# Patient Record
Sex: FEMALE | Race: WHITE | Employment: FULL TIME | URBAN - METROPOLITAN AREA
[De-identification: names, ages, dates, MRNs, and addresses within clinical notes are randomized per-mention and may not be internally consistent; named-entity substitution may affect disease eponyms.]

---

## 2017-08-25 ENCOUNTER — APPOINTMENT (OUTPATIENT)
Dept: GENERAL RADIOLOGY | Age: 47
End: 2017-08-25
Attending: FAMILY MEDICINE
Payer: SELF-PAY

## 2017-08-25 ENCOUNTER — HOSPITAL ENCOUNTER (EMERGENCY)
Age: 47
Discharge: HOME | End: 2017-08-25
Attending: FAMILY MEDICINE
Payer: SELF-PAY

## 2017-08-25 VITALS
DIASTOLIC BLOOD PRESSURE: 72 MMHG | SYSTOLIC BLOOD PRESSURE: 124 MMHG | RESPIRATION RATE: 16 BRPM | TEMPERATURE: 98.8 F | HEART RATE: 75 BPM | HEIGHT: 64 IN | BODY MASS INDEX: 25.95 KG/M2 | WEIGHT: 152 LBS | OXYGEN SATURATION: 98 %

## 2017-08-25 DIAGNOSIS — S82.831A CLOSED FRACTURE OF DISTAL END OF RIGHT FIBULA, UNSPECIFIED FRACTURE MORPHOLOGY, INITIAL ENCOUNTER: Primary | ICD-10-CM

## 2017-08-25 PROCEDURE — 75810000053 HC SPLINT APPLICATION

## 2017-08-25 PROCEDURE — 99283 EMERGENCY DEPT VISIT LOW MDM: CPT

## 2017-08-25 PROCEDURE — 73610 X-RAY EXAM OF ANKLE: CPT

## 2017-08-25 RX ORDER — IBUPROFEN 600 MG/1
600 TABLET ORAL
Qty: 20 TAB | Refills: 0 | Status: SHIPPED | OUTPATIENT
Start: 2017-08-25

## 2017-08-25 RX ORDER — OXYCODONE AND ACETAMINOPHEN 5; 325 MG/1; MG/1
1 TABLET ORAL
Qty: 10 TAB | Refills: 0 | Status: SHIPPED | OUTPATIENT
Start: 2017-08-25

## 2017-08-25 NOTE — ED PROVIDER NOTES
[address]  EMERGENCY DEPARTMENT HISTORY AND PHYSICAL EXAM       Date: 2017   Patient Name: Maura Yoon   YOB: 1970  Medical Record Number: [id number]    History of Presenting Illness     Chief Complaint   Patient presents with    Ankle Injury        History Provided By:  patient    Additional History: 8:46 AM  Maura Yoon is a 52 y.o. female presenting to the ED C/O right ankle pain onset last night s/p slipping on steps and falling. Associated sxs include right ankle swelling. PSHx includes tubal ligation and appendectomy. Pt denies head injury, and any other symptoms or complaints at this time. Primary Care Provider: None   Specialist:    Past History     Past Medical History:   History reviewed. No pertinent past medical history. Past Surgical History:   Past Surgical History:   Procedure Laterality Date    HX APPENDECTOMY      HX  SECTION      HX TUBAL LIGATION          Family History:   History reviewed. No pertinent family history. Social History:   Social History   Substance Use Topics    Smoking status: Current Every Day Smoker     Packs/day: 0.50    Smokeless tobacco: None    Alcohol use Yes      Comment: socially        Allergies:   No Known Allergies     Review of Systems   Review of Systems   Constitutional: Negative for chills and fever. Musculoskeletal: Positive for arthralgias (right ankle) and joint swelling (right ankle). All other systems reviewed and are negative. Physical Exam  Vitals:    17 0844   BP: 124/72   Pulse: 75   Resp: 16   Temp: 98.8 °F (37.1 °C)   SpO2: 98%   Weight: 68.9 kg (152 lb)   Height: 5' 4\" (1.626 m)       Physical Exam   Constitutional: She is oriented to person, place, and time. She appears well-developed and well-nourished. No distress. HENT:   Head: Normocephalic and atraumatic. Eyes: Conjunctivae and EOM are normal. Pupils are equal, round, and reactive to light.    Neck: Normal range of motion. Neck supple. Musculoskeletal: She exhibits edema and tenderness. She exhibits no deformity. Right knee: Normal.        Right ankle: She exhibits decreased range of motion and swelling. She exhibits no ecchymosis, no deformity, no laceration and normal pulse. Tenderness. Lateral malleolus tenderness found. No medial malleolus tenderness found. Achilles tendon normal.        Right foot: Normal.   Pedal pulses normal   Neurological: She is alert and oriented to person, place, and time. Skin: Skin is warm and dry. Psychiatric: She has a normal mood and affect. Her behavior is normal.   Nursing note and vitals reviewed. Diagnostic Study Results     Labs -    No results found for this or any previous visit (from the past 12 hour(s)). Radiologic Studies -  The following have been ordered and reviewed:  XR ANKLE RT MIN 3 V    (Results Pending)     9:10 AM  RADIOLOGY FINDINGS  Right ankle X-ray shows distal fibula fracture. Pending review by Radiologist  Recorded by Sherry Negrete ED Scribe, as dictated by Nereyda Ruelas PA-C    Medical Decision Making   I am the first provider for this patient. I reviewed the vital signs, available nursing notes, past medical history, past surgical history, family history and social history. Vital Signs-Reviewed the patient's vital signs. Patient Vitals for the past 12 hrs:   Temp Pulse Resp BP SpO2   08/25/17 0844 98.8 °F (37.1 °C) 75 16 124/72 98 %       Pulse Oximetry Analysis - Normal 98% on RA. Old Medical Records: Nursing notes. Procedures:   Procedures    Procedure Note - Splint Assessement:  Performed by: Nereyda Ruelas PA-C  Splint to right ankle assessed. Neurovascularly intact. The procedure took 1-15 minutes, and pt tolerated well. Written by Sherry Negrete ED Scribe, as dictated by MARGIE Liang     ED Course:  8:26 AM  Initial assessment performed.  The patients presenting problems have been discussed, and they are in agreement with the care plan formulated and outlined with them. I have encouraged them to ask questions as they arise throughout their visit. Medications Given in the ED:  Medications - No data to display    Discharge Note:  9:14 AM  Pt has been reexamined. Patient has no new complaints, changes, or physical findings. Care plan outlined and precautions discussed. Results were reviewed with the patient. All medications were reviewed with the patient; will d/c home with Percocet and Motrin. All of pt's questions and concerns were addressed. Patient was instructed and agrees to follow up with Orthopedics, as well as to return to the ED upon further deterioration. Patient is ready to go home. Diagnosis   Clinical Impression:   1. Closed fracture of distal end of right fibula, unspecified fracture morphology, initial encounter         Follow-up Information     Follow up With Details Comments Contact Info    Fransisca Mcleod MD Schedule an appointment as soon as possible for a visit in 2 days For orthopedic follow up. [address]      THE [facility] EMERGENCY DEPT Go to As needed, If symptoms worsen [address] [facility]  [phone]          Current Discharge Medication List      START taking these medications    Details   oxyCODONE-acetaminophen (PERCOCET) 5-325 mg per tablet Take 1 Tab by mouth every four (4) hours as needed for Pain. Max Daily Amount: 6 Tabs. Qty: 10 Tab, Refills: 0      ibuprofen (MOTRIN) 600 mg tablet Take 1 Tab by mouth every six (6) hours as needed for Pain. Qty: 20 Tab, Refills: 0             _______________________________   Attestations: This note is prepared by Santa Winslow, acting as a Scribe for Cory Shelley PA-C on 8:37 AM on 8/25/2017 .     Cory Shelley PA-C: The scribe's documentation has been prepared under my direction and personally reviewed by me in its entirety.   _______________________________

## 2017-08-25 NOTE — DISCHARGE INSTRUCTIONS
Broken Ankle: Care Instructions  Your Care Instructions    An ankle may break (fracture) during sports, a fall, or other accidents. Fractures can range from a small, hairline crack, to a bone or bones broken into two or more pieces. Your treatment depends on how bad the break is. Your doctor may have put your ankle in a splint or cast to allow it to heal or to keep it stable until you see another doctor. It may take weeks or months for your ankle to heal. You can help your ankle heal with some care at home. You heal best when you take good care of yourself. Eat a variety of healthy foods, and don't smoke. You may have had a sedative to help you relax. You may be unsteady after having sedation. It can take a few hours for the medicine's effects to wear off. Common side effects of sedation include nausea, vomiting, and feeling sleepy or tired. The doctor has checked you carefully, but problems can develop later. If you notice any problems or new symptoms, get medical treatment right away. Follow-up care is a key part of your treatment and safety. Be sure to make and go to all appointments, and call your doctor if you are having problems. It's also a good idea to know your test results and keep a list of the medicines you take. How can you care for yourself at home? · If the doctor gave you a sedative:  ¨ For 24 hours, don't do anything that requires attention to detail. It takes time for the medicine's effects to completely wear off. ¨ For your safety, do not drive or operate any machinery that could be dangerous. Wait until the medicine wears off and you can think clearly and react easily. · Put ice or a cold pack on your ankle for 10 to 20 minutes at a time. Try to do this every 1 to 2 hours for the next 3 days (when you are awake). Put a thin cloth between the ice and your cast or splint. Keep your cast or splint dry. · Follow the cast care instructions your doctor gives you.  If you have a splint, do not take it off unless your doctor tells you to. · Be safe with medicines. Take pain medicines exactly as directed. ¨ If the doctor gave you a prescription medicine for pain, take it as prescribed. ¨ If you are not taking a prescription pain medicine, ask your doctor if you can take an over-the-counter medicine. · Prop up your leg on pillows in the first few days after the injury. Keep the ankle higher than the level of your heart. This will help reduce swelling. · Do not put weight on your ankle unless your doctor tells you to. Use crutches to walk. · Follow instructions for exercises to keep your leg strong. · Wiggle your toes often to reduce swelling and stiffness. When should you call for help? Call 911 anytime you think you may need emergency care. For example, call if:  · You have trouble breathing. · You passed out (lost consciousness). · You have sudden chest pain and shortness of breath, or you cough up blood. Call your doctor now or seek immediate medical care if:  · You have new or worse nausea or vomiting. · You have increased or severe pain. · Your foot is cool or pale or changes color. · You have tingling, weakness, or numbness in your toes. · Your cast or splint feels too tight. · You cannot move your toes. · You have signs of a blood clot, such as:  ¨ Pain in your calf, back of the knee, thigh, or groin. ¨ Redness and swelling in your leg or groin. · The skin under your cast or splint is burning or stinging. Watch closely for changes in your health, and be sure to contact your doctor if:  · You do not get better as expected. Where can you learn more? Go to http://ezra-rosa maria.info/. Enter P763 in the search box to learn more about \"Broken Ankle: Care Instructions. \"  Current as of: March 21, 2017  Content Version: 11.3  © 1189-8116 [employer].  Care instructions adapted under license by [employer] (which disclaims liability or warranty for this information). If you have questions about a medical condition or this instruction, always ask your healthcare professional. [place] [address] any warranty or liability for your use of this information.

## 2017-08-25 NOTE — LETTER
[facility] [place] 
THE [facility] EMERGENCY DEPT 
[address] [address] 
[phone] Work/School Note Date: 8/25/2017 To Whom It May concern: 
 
Miguelito Gold was seen and treated today in the emergency room by the following provider(s): 
Attending Provider: Doris Gonzalez MD 
Physician Assistant: MARGIE Thomas. Miguelito Gold may return to work on 8/31/17.  
 
Sincerely, 
 
 
 
 
Brenda Mitchell PA-C

## 2021-05-19 ENCOUNTER — APPOINTMENT (OUTPATIENT)
Dept: GENERAL RADIOLOGY | Age: 51
End: 2021-05-19
Attending: INTERNAL MEDICINE
Payer: MEDICAID

## 2021-05-19 ENCOUNTER — APPOINTMENT (OUTPATIENT)
Dept: CT IMAGING | Age: 51
End: 2021-05-19
Attending: INTERNAL MEDICINE
Payer: MEDICAID

## 2021-05-19 ENCOUNTER — APPOINTMENT (OUTPATIENT)
Dept: CT IMAGING | Age: 51
End: 2021-05-19
Attending: EMERGENCY MEDICINE
Payer: MEDICAID

## 2021-05-19 ENCOUNTER — APPOINTMENT (OUTPATIENT)
Dept: MRI IMAGING | Age: 51
End: 2021-05-19
Attending: INTERNAL MEDICINE
Payer: MEDICAID

## 2021-05-19 ENCOUNTER — HOSPITAL ENCOUNTER (OUTPATIENT)
Age: 51
Setting detail: OBSERVATION
Discharge: HOME | End: 2021-05-20
Attending: EMERGENCY MEDICINE | Admitting: INTERNAL MEDICINE
Payer: MEDICAID

## 2021-05-19 DIAGNOSIS — R47.81 SLURRED SPEECH: ICD-10-CM

## 2021-05-19 DIAGNOSIS — G45.9 TIA (TRANSIENT ISCHEMIC ATTACK): Primary | ICD-10-CM

## 2021-05-19 PROBLEM — Z72.0 TOBACCO ABUSE DISORDER: Status: ACTIVE | Noted: 2021-05-19

## 2021-05-19 LAB
ALBUMIN SERPL-MCNC: 4.3 G/DL (ref 3.4–5)
ALBUMIN/GLOB SERPL: 1.2 {RATIO} (ref 0.8–1.7)
ALP SERPL-CCNC: 99 U/L (ref 45–117)
ALT SERPL-CCNC: 24 U/L (ref 13–56)
AMPHET UR QL SCN: NEGATIVE
ANION GAP SERPL CALC-SCNC: 5 MMOL/L (ref 3–18)
APPEARANCE UR: CLEAR
AST SERPL-CCNC: 26 U/L (ref 10–38)
ATRIAL RATE: 70 BPM
BARBITURATES UR QL SCN: NEGATIVE
BASOPHILS # BLD: 0.1 K/UL (ref 0–0.1)
BASOPHILS NFR BLD: 1 % (ref 0–2)
BENZODIAZ UR QL: NEGATIVE
BILIRUB SERPL-MCNC: 0.4 MG/DL (ref 0.2–1)
BILIRUB UR QL: NEGATIVE
BUN SERPL-MCNC: 13 MG/DL (ref 7–18)
BUN/CREAT SERPL: 21 (ref 12–20)
CALCIUM SERPL-MCNC: 9.6 MG/DL (ref 8.5–10.1)
CALCULATED P AXIS, ECG09: 55 DEGREES
CALCULATED R AXIS, ECG10: 84 DEGREES
CALCULATED T AXIS, ECG11: 53 DEGREES
CANNABINOIDS UR QL SCN: NEGATIVE
CHLORIDE SERPL-SCNC: 105 MMOL/L (ref 100–111)
CK MB CFR SERPL CALC: 1.1 % (ref 0–4)
CK MB SERPL-MCNC: 1.5 NG/ML (ref 5–25)
CK SERPL-CCNC: 137 U/L (ref 26–192)
CO2 SERPL-SCNC: 27 MMOL/L (ref 21–32)
COCAINE UR QL SCN: NEGATIVE
COLOR UR: YELLOW
CREAT SERPL-MCNC: 0.63 MG/DL (ref 0.6–1.3)
DIAGNOSIS, 93000: NORMAL
DIFFERENTIAL METHOD BLD: ABNORMAL
EOSINOPHIL # BLD: 0.2 K/UL (ref 0–0.4)
EOSINOPHIL NFR BLD: 2 % (ref 0–5)
ERYTHROCYTE [DISTWIDTH] IN BLOOD BY AUTOMATED COUNT: 12.6 % (ref 11.6–14.5)
GLOBULIN SER CALC-MCNC: 3.7 G/DL (ref 2–4)
GLUCOSE SERPL-MCNC: 89 MG/DL (ref 74–99)
GLUCOSE UR STRIP.AUTO-MCNC: NEGATIVE MG/DL
HCT VFR BLD AUTO: 43 % (ref 35–45)
HDSCOM,HDSCOM: NORMAL
HGB BLD-MCNC: 14.7 G/DL (ref 12–16)
HGB UR QL STRIP: NEGATIVE
KETONES UR QL STRIP.AUTO: NEGATIVE MG/DL
LEUKOCYTE ESTERASE UR QL STRIP.AUTO: NEGATIVE
LYMPHOCYTES # BLD: 3.5 K/UL (ref 0.9–3.6)
LYMPHOCYTES NFR BLD: 41 % (ref 21–52)
MCH RBC QN AUTO: 32 PG (ref 24–34)
MCHC RBC AUTO-ENTMCNC: 34.2 G/DL (ref 31–37)
MCV RBC AUTO: 93.7 FL (ref 74–97)
METHADONE UR QL: NEGATIVE
MONOCYTES # BLD: 0.8 K/UL (ref 0.05–1.2)
MONOCYTES NFR BLD: 9 % (ref 3–10)
NEUTS SEG # BLD: 4.2 K/UL (ref 1.8–8)
NEUTS SEG NFR BLD: 48 % (ref 40–73)
NITRITE UR QL STRIP.AUTO: NEGATIVE
OPIATES UR QL: NEGATIVE
P-R INTERVAL, ECG05: 138 MS
PCP UR QL: NEGATIVE
PH UR STRIP: 7 [PH] (ref 5–8)
PLATELET # BLD AUTO: 327 K/UL (ref 135–420)
PMV BLD AUTO: 9 FL (ref 9.2–11.8)
POTASSIUM SERPL-SCNC: 4.1 MMOL/L (ref 3.5–5.5)
PROT SERPL-MCNC: 8 G/DL (ref 6.4–8.2)
PROT UR STRIP-MCNC: NEGATIVE MG/DL
Q-T INTERVAL, ECG07: 386 MS
QRS DURATION, ECG06: 82 MS
QTC CALCULATION (BEZET), ECG08: 416 MS
RBC # BLD AUTO: 4.59 M/UL (ref 4.2–5.3)
SODIUM SERPL-SCNC: 137 MMOL/L (ref 136–145)
SP GR UR REFRACTOMETRY: <1.005 (ref 1–1.03)
TROPONIN I SERPL-MCNC: <0.02 NG/ML (ref 0–0.04)
TROPONIN I SERPL-MCNC: <0.02 NG/ML (ref 0–0.04)
TSH SERPL DL<=0.05 MIU/L-ACNC: 2.3 UIU/ML (ref 0.36–3.74)
UROBILINOGEN UR QL STRIP.AUTO: 0.2 EU/DL (ref 0.2–1)
VENTRICULAR RATE, ECG03: 70 BPM
WBC # BLD AUTO: 8.7 K/UL (ref 4.6–13.2)

## 2021-05-19 PROCEDURE — 93005 ELECTROCARDIOGRAM TRACING: CPT

## 2021-05-19 PROCEDURE — 80053 COMPREHEN METABOLIC PANEL: CPT

## 2021-05-19 PROCEDURE — 74011000636 HC RX REV CODE- 636: Performed by: INTERNAL MEDICINE

## 2021-05-19 PROCEDURE — 70551 MRI BRAIN STEM W/O DYE: CPT

## 2021-05-19 PROCEDURE — 99285 EMERGENCY DEPT VISIT HI MDM: CPT

## 2021-05-19 PROCEDURE — 85025 COMPLETE CBC W/AUTO DIFF WBC: CPT

## 2021-05-19 PROCEDURE — 84443 ASSAY THYROID STIM HORMONE: CPT

## 2021-05-19 PROCEDURE — 70496 CT ANGIOGRAPHY HEAD: CPT

## 2021-05-19 PROCEDURE — 82550 ASSAY OF CK (CPK): CPT

## 2021-05-19 PROCEDURE — 74011250637 HC RX REV CODE- 250/637: Performed by: EMERGENCY MEDICINE

## 2021-05-19 PROCEDURE — 80307 DRUG TEST PRSMV CHEM ANLYZR: CPT

## 2021-05-19 PROCEDURE — 84484 ASSAY OF TROPONIN QUANT: CPT

## 2021-05-19 PROCEDURE — 74011250636 HC RX REV CODE- 250/636: Performed by: INTERNAL MEDICINE

## 2021-05-19 PROCEDURE — 71045 X-RAY EXAM CHEST 1 VIEW: CPT

## 2021-05-19 PROCEDURE — 36415 COLL VENOUS BLD VENIPUNCTURE: CPT

## 2021-05-19 PROCEDURE — 99218 HC RM OBSERVATION: CPT

## 2021-05-19 PROCEDURE — 74011250636 HC RX REV CODE- 250/636: Performed by: EMERGENCY MEDICINE

## 2021-05-19 PROCEDURE — 74011250637 HC RX REV CODE- 250/637: Performed by: INTERNAL MEDICINE

## 2021-05-19 PROCEDURE — 81003 URINALYSIS AUTO W/O SCOPE: CPT

## 2021-05-19 PROCEDURE — 70450 CT HEAD/BRAIN W/O DYE: CPT

## 2021-05-19 RX ORDER — ATORVASTATIN CALCIUM 20 MG/1
80 TABLET, FILM COATED ORAL
Status: DISCONTINUED | OUTPATIENT
Start: 2021-05-19 | End: 2021-05-20 | Stop reason: HOSPADM

## 2021-05-19 RX ORDER — POLYETHYLENE GLYCOL 3350 17 G/17G
17 POWDER, FOR SOLUTION ORAL DAILY PRN
Status: DISCONTINUED | OUTPATIENT
Start: 2021-05-19 | End: 2021-05-20 | Stop reason: HOSPADM

## 2021-05-19 RX ORDER — HEPARIN SODIUM 5000 [USP'U]/ML
5000 INJECTION, SOLUTION INTRAVENOUS; SUBCUTANEOUS EVERY 8 HOURS
Status: DISCONTINUED | OUTPATIENT
Start: 2021-05-19 | End: 2021-05-20 | Stop reason: HOSPADM

## 2021-05-19 RX ORDER — ROPINIROLE 1 MG/1
3 TABLET, FILM COATED ORAL
Status: DISCONTINUED | OUTPATIENT
Start: 2021-05-19 | End: 2021-05-20 | Stop reason: HOSPADM

## 2021-05-19 RX ORDER — SODIUM CHLORIDE 9 MG/ML
125 INJECTION, SOLUTION INTRAVENOUS CONTINUOUS
Status: DISCONTINUED | OUTPATIENT
Start: 2021-05-19 | End: 2021-05-20 | Stop reason: HOSPADM

## 2021-05-19 RX ORDER — MECLIZINE HCL 12.5 MG 12.5 MG/1
25 TABLET ORAL
Status: COMPLETED | OUTPATIENT
Start: 2021-05-19 | End: 2021-05-19

## 2021-05-19 RX ORDER — LABETALOL HCL 20 MG/4 ML
5 SYRINGE (ML) INTRAVENOUS
Status: DISCONTINUED | OUTPATIENT
Start: 2021-05-19 | End: 2021-05-20 | Stop reason: HOSPADM

## 2021-05-19 RX ORDER — ASPIRIN 325 MG
325 TABLET ORAL
Status: COMPLETED | OUTPATIENT
Start: 2021-05-19 | End: 2021-05-19

## 2021-05-19 RX ORDER — ROPINIROLE 6 MG/1
1 TABLET, FILM COATED, EXTENDED RELEASE ORAL
COMMUNITY

## 2021-05-19 RX ORDER — PAROXETINE HYDROCHLORIDE 20 MG/1
20 TABLET, FILM COATED ORAL EVERY EVENING
COMMUNITY

## 2021-05-19 RX ORDER — GUAIFENESIN 100 MG/5ML
81 LIQUID (ML) ORAL DAILY
Status: DISCONTINUED | OUTPATIENT
Start: 2021-05-20 | End: 2021-05-20 | Stop reason: HOSPADM

## 2021-05-19 RX ORDER — PAROXETINE HYDROCHLORIDE 20 MG/1
20 TABLET, FILM COATED ORAL
Status: DISCONTINUED | OUTPATIENT
Start: 2021-05-19 | End: 2021-05-20 | Stop reason: HOSPADM

## 2021-05-19 RX ADMIN — ROPINIROLE HYDROCHLORIDE 3 MG: 1 TABLET, FILM COATED ORAL at 21:42

## 2021-05-19 RX ADMIN — SODIUM CHLORIDE 125 ML/HR: 900 INJECTION, SOLUTION INTRAVENOUS at 15:54

## 2021-05-19 RX ADMIN — IOPAMIDOL 100 ML: 755 INJECTION, SOLUTION INTRAVENOUS at 17:10

## 2021-05-19 RX ADMIN — MECLIZINE 25 MG: 12.5 TABLET ORAL at 14:29

## 2021-05-19 RX ADMIN — ASPIRIN 325 MG ORAL TABLET 325 MG: 325 PILL ORAL at 15:58

## 2021-05-19 RX ADMIN — SODIUM CHLORIDE 1000 ML: 900 INJECTION, SOLUTION INTRAVENOUS at 14:14

## 2021-05-19 RX ADMIN — PAROXETINE HYDROCHLORIDE 20 MG: 20 TABLET, FILM COATED ORAL at 21:41

## 2021-05-19 NOTE — PROGRESS NOTES
Problem: Falls - Risk of  Goal: *Absence of Falls  Description: Document Speedy León Fall Risk and appropriate interventions in the flowsheet.   Outcome: Progressing Towards Goal  Note: Fall Risk Interventions:                                Problem: Patient Education: Go to Patient Education Activity  Goal: Patient/Family Education  Outcome: Progressing Towards Goal     Problem: Anxiety  Goal: *Alleviation of anxiety  Outcome: Progressing Towards Goal     Problem: Patient Education: Go to Patient Education Activity  Goal: Patient/Family Education  Outcome: Progressing Towards Goal

## 2021-05-19 NOTE — H&P
History & Physical    Patient: Sue Jara MRN: [id number]  CSN: [id number]    YOB: 1970  Age: 46 y.o. Sex: female      DOA: 2021    Chief Complaint:   Chief Complaint   Patient presents with    Dizziness          HPI:     Sue Jara is a 46 y.o.  female who has history of tobacco disorder, depression and restless leg presents to the emergency room acute onset of slurred speech and dizziness that started after putting on her Thrive patch. She attempted to go to work but was feeling dizzy and lightheaded she was told to go to the emergency room by her boss. By the time she came to the emergency room her dizziness and slurred speech had improved a stat CT was done and was negative a code S was not called as her speech had improved it was thought that she should be admitted for further work-up  Patient denies history of Covid exposure she has not had her Covid vaccine    History reviewed. No pertinent past medical history. Past Surgical History:   Procedure Laterality Date    HX APPENDECTOMY      HX  SECTION      HX TUBAL LIGATION         History reviewed. No pertinent family history. Social History     Socioeconomic History    Marital status: SINGLE     Spouse name: Not on file    Number of children: Not on file    Years of education: Not on file    Highest education level: Not on file   Tobacco Use    Smoking status: Current Every Day Smoker     Packs/day: 0.50    Smokeless tobacco: Never Used   Substance and Sexual Activity    Alcohol use: Yes     Comment: socially    Drug use: No     Social Determinants of Health     Financial Resource Strain:     Difficulty of Paying Living Expenses:    Food Insecurity:     Worried About Running Out of Food in the Last Year:     [address] in the Last Year:    Transportation Needs:     Lack of Transportation (Medical):      Lack of Transportation (Non-Medical):    Physical Activity:     Days of Exercise per Week:  Minutes of Exercise per Session:    Stress:     Feeling of Stress :    Social Connections:     Frequency of Communication with Friends and Family:     Frequency of Social Gatherings with Friends and Family:     Attends [employer] Services:     Active Member of Clubs or Organizations:     Attends Club or Organization Meetings:     Marital Status:        Prior to Admission medications    Medication Sig Start Date End Date Taking? Authorizing Provider   oxyCODONE-acetaminophen (PERCOCET) 5-325 mg per tablet Take 1 Tab by mouth every four (4) hours as needed for Pain. Max Daily Amount: 6 Tabs. 17   Raulito Andrews PA   ibuprofen (MOTRIN) 600 mg tablet Take 1 Tab by mouth every six (6) hours as needed for Pain. 17   Raulito Andrews PA       No Known Allergies      Review of Systems  GENERAL: Patient alert, awake and oriented times 3, able to communicate full sentences and not in distress. HEENT: No change in vision, no earache, tinnitus, sore throat or sinus congestion. NECK: No pain or stiffness. PULMONARY: No shortness of breath, cough or wheeze. Cardiovascular: no pnd or orthopnea, no CP  GASTROINTESTINAL: No abdominal pain, nausea, vomiting or diarrhea, melena or bright red blood per rectum. GENITOURINARY: No urinary frequency, urgency, hesitancy or dysuria. MUSCULOSKELETAL: No joint or muscle pain, no back pain, no recent trauma. DERMATOLOGIC: No rash, no itching, no lesions. ENDOCRINE: No polyuria, polydipsia, no heat or cold intolerance. No recent change in weight. HEMATOLOGICAL: No anemia or easy bruising or bleeding. NEUROLOGIC: No headache, seizures, numbness, tingling or weakness.        Physical Exam:     Physical Exam:  Visit Vitals  BP (!) 105/53 (BP 1 Location: Left upper arm, BP Patient Position: At rest;Sitting)   Pulse 79   Temp 98.1 °F (36.7 °C)   Resp 16   SpO2 100%      O2 Device: None (Room air)    Temp (24hrs), Av.1 °F (36.7 °C), Min:98.1 °F (36.7 °C), Max:98.1 °F (36.7 °C)    No intake/output data recorded. No intake/output data recorded. General:  Alert, cooperative, no distress, appears stated age. Head: Normocephalic, without obvious abnormality, atraumatic. Eyes:  Conjunctivae/corneas clear. PERRL, EOMs intact. Nose: Nares normal. No drainage or sinus tenderness. Neck: Supple, symmetrical, trachea midline, no adenopathy, thyroid: no enlargement, no carotid bruit and no JVD. Lungs:   Clear to auscultation bilaterally. Heart:  Regular rate and rhythm, S1, S2 normal.     Abdomen: Soft, non-tender. Bowel sounds normal.    Extremities: Extremities normal, atraumatic, no cyanosis or edema. Pulses: 2+ and symmetric all extremities. Skin:  No rashes or lesions   Neurologic: AAOx3, No focal motor or sensory deficit.    Finger-to-nose intact rapid alternating hand movement intact extraocular movements intact no nystagmus with rotation of head upper extremity and lower extremity strength normal    Labs Reviewed:  Recent Results (from the past 24 hour(s))   URINALYSIS W/ RFLX MICROSCOPIC    Collection Time: 05/19/21  2:00 PM   Result Value Ref Range    Color YELLOW      Appearance CLEAR      Specific gravity <1.005 (L) 1.005 - 1.030    pH (UA) 7.0 5.0 - 8.0      Protein Negative NEG mg/dL    Glucose Negative NEG mg/dL    Ketone Negative NEG mg/dL    Bilirubin Negative NEG      Blood Negative NEG      Urobilinogen 0.2 0.2 - 1.0 EU/dL    Nitrites Negative NEG      Leukocyte Esterase Negative NEG     METABOLIC PANEL, COMPREHENSIVE    Collection Time: 05/19/21  2:00 PM   Result Value Ref Range    Sodium 137 136 - 145 mmol/L    Potassium 4.1 3.5 - 5.5 mmol/L    Chloride 105 100 - 111 mmol/L    CO2 27 21 - 32 mmol/L    Anion gap 5 3.0 - 18 mmol/L    Glucose 89 74 - 99 mg/dL    BUN 13 7.0 - 18 MG/DL    Creatinine 0.63 0.6 - 1.3 MG/DL    BUN/Creatinine ratio 21 (H) 12 - 20      GFR est AA >60 >60 ml/min/1.73m2    GFR est non-AA >60 >60 ml/min/1.73m2    Calcium 9.6 8.5 - 10.1 MG/DL    Bilirubin, total 0.4 0.2 - 1.0 MG/DL    ALT (SGPT) 24 13 - 56 U/L    AST (SGOT) 26 10 - 38 U/L    Alk. phosphatase 99 45 - 117 U/L    Protein, total 8.0 6.4 - 8.2 g/dL    Albumin 4.3 3.4 - 5.0 g/dL    Globulin 3.7 2.0 - 4.0 g/dL    A-G Ratio 1.2 0.8 - 1.7     TROPONIN I    Collection Time: 05/19/21  2:00 PM   Result Value Ref Range    Troponin-I, QT <0.02 0.0 - 0.045 NG/ML   CBC WITH AUTOMATED DIFF    Collection Time: 05/19/21  2:00 PM   Result Value Ref Range    WBC 8.7 4.6 - 13.2 K/uL    RBC 4.59 4.20 - 5.30 M/uL    HGB 14.7 12.0 - 16.0 g/dL    HCT 43.0 35.0 - 45.0 %    MCV 93.7 74.0 - 97.0 FL    MCH 32.0 24.0 - 34.0 PG    MCHC 34.2 31.0 - 37.0 g/dL    RDW 12.6 11.6 - 14.5 %    PLATELET 360 711 - 081 K/uL    MPV 9.0 (L) 9.2 - 11.8 FL    NEUTROPHILS 48 40 - 73 %    LYMPHOCYTES 41 21 - 52 %    MONOCYTES 9 3 - 10 %    EOSINOPHILS 2 0 - 5 %    BASOPHILS 1 0 - 2 %    ABS. NEUTROPHILS 4.2 1.8 - 8.0 K/UL    ABS. LYMPHOCYTES 3.5 0.9 - 3.6 K/UL    ABS. MONOCYTES 0.8 0.05 - 1.2 K/UL    ABS. EOSINOPHILS 0.2 0.0 - 0.4 K/UL    ABS.  BASOPHILS 0.1 0.0 - 0.1 K/UL    DF AUTOMATED     TSH 3RD GENERATION    Collection Time: 05/19/21  2:00 PM   Result Value Ref Range    TSH 2.30 0.36 - 3.74 uIU/mL   DRUG SCREEN, URINE    Collection Time: 05/19/21  2:00 PM   Result Value Ref Range    BENZODIAZEPINES Negative NEG      BARBITURATES Negative NEG      THC (TH-CANNABINOL) Negative NEG      OPIATES Negative NEG      PCP(PHENCYCLIDINE) Negative NEG      COCAINE Negative NEG      AMPHETAMINES Negative NEG      METHADONE Negative NEG      HDSCOM (NOTE)    EKG, 12 LEAD, INITIAL    Collection Time: 05/19/21  2:13 PM   Result Value Ref Range    Ventricular Rate 70 BPM    Atrial Rate 70 BPM    P-R Interval 138 ms    QRS Duration 82 ms    Q-T Interval 386 ms    QTC Calculation (Bezet) 416 ms    Calculated P Axis 55 degrees    Calculated R Axis 84 degrees    Calculated T Axis 53 degrees    Diagnosis Normal sinus rhythm  Normal ECG  No previous ECGs available       All lab results for the last 24 hours reviewed. and EKG    Procedures/imaging: see electronic medical records for all procedures/Xrays and details which were not copied into this note but were reviewed prior to creation of Plan    CT Results  (Last 48 hours)               05/19/21 1426  CT HEAD WO CONT Final result    Impression:          1. No acute intracranial abnormality. Narrative:  EXAM: CT head       INDICATION: Dizziness       COMPARISON: None. TECHNIQUE: Axial CT imaging of the head was performed without intravenous   contrast. Standard multiplanar coronal and sagittal reformatted images were   obtained and are included in interpretation. One or more dose reduction techniques were used on this CT: automated exposure   control, adjustment of the mAs and/or kVp according to patient size, and   iterative reconstruction techniques. The specific techniques used on this CT   exam have been documented in the patient's electronic medical record. Digital   Imaging and Communications in Medicine (DICOM) format image data are available   to nonaffiliated external healthcare facilities or entities on a secure, media   free, reciprocally searchable basis with patient authorization for at least a   12-month period after this study. _______________       FINDINGS:       BRAIN AND POSTERIOR FOSSA: The sulci, folia, ventricles and basal cisterns are   within normal limits for the patient?s age. There is no intracranial hemorrhage,   mass effect, or midline shift. There are no areas of abnormal parenchymal   attenuation. EXTRA-AXIAL SPACES AND MENINGES: There are no abnormal extra-axial fluid   collections. CALVARIUM: Intact. SINUSES: Clear. OTHER: None.       _______________               Assessment/Plan     Active Problems:  1.  Aspirin 325mg po given then  Continue aspirin 81 mg and atorvastatin 80 mg daily.   2. telemetry monitoring while in-house. 3. Neuro checks per stroke protocol  4. Permissive hypertension (do not treat if BP is not >200/110, prefer prn labetolol 5mg)  5. IV normal saline continuous infusion 100-125 ml/h  6. Euglycemia with sliding scale insulin  7. Euthermia with acetaminophen 650mg Q6h prn for fever  8. We will avoid urinary cathethers   9. MRI without contrast of brain   10. CTA  11. Echocardiogram with bubble study per protcol ordered as well as lipid panel and HBA1c   12. PT /OT and ST consults placed           DVT/GI Prophylaxis: Hep SQ        Mario Arauz MD  5/19/2021 2:45 PM

## 2021-05-19 NOTE — ED TRIAGE NOTES
Patient reports dizziness that started 1030 today with emesis episode.  Reports \"I cant keep my eyes open, I feel like jello\"

## 2021-05-19 NOTE — PROGRESS NOTES
Bedside shift change report given to [address] [place] [address] Road (oncoming nurse) by Jamarcus Pierre RN (offgoing nurse). Report included the following information SBAR, Kardex, Intake/Output, MAR and Recent Results.

## 2021-05-19 NOTE — ED PROVIDER NOTES
Patient states she drank 2 cups of coffee, had her thrive and put on her Thrive patch. Then attempted to go to work. She states while she was on the [place] she felt extremely nauseous, dizzy like she could not form words right. She was talking on phone with a friend. When she arrived to work she states \"it was all I could do to get myself to my boss. \"  She says she felt weak and dizzy, had a sensation of room spinning while she was trying to talk to her boss. She left work as though she felt she could not work. [facility] Saw the doctor there was told that she was bipolar. She said that this was unbelievable, so she [facility] second [facility] where they then advised her to come to the emergency department for further evaluation    She has had intermittent periods where she cannot get the words out of her mouth, her speech is slurred and not quite right. She denies chest pain or shortness of breath. She said she has never had anything like this happen to her before. She is a longtime smoker, she does have a history of ADHD is on medication for that as well as restless leg syndrome. The history is provided by the patient. Dizziness  This is a recurrent problem. The current episode started 1 to 2 hours ago. There was no focality noted. Primary symptoms include speech difficulty. Pertinent negatives include no focal weakness, no slurred speech, no memory loss and no mental status change. There has been no fever. Associated symptoms include nausea. Pertinent negatives include no shortness of breath, no chest pain, no vomiting, no confusion, no headaches, no choking, no bowel incontinence and no bladder incontinence. Associated medical issues do not include trauma. History reviewed. No pertinent past medical history. Past Surgical History:   Procedure Laterality Date    HX APPENDECTOMY      HX  SECTION      HX TUBAL LIGATION           History reviewed.  No pertinent family history. Social History     Socioeconomic History    Marital status: SINGLE     Spouse name: Not on file    Number of children: Not on file    Years of education: Not on file    Highest education level: Not on file   Occupational History    Not on file   Tobacco Use    Smoking status: Current Every Day Smoker     Packs/day: 0.50    Smokeless tobacco: Never Used   Substance and Sexual Activity    Alcohol use: Yes     Comment: socially    Drug use: No    Sexual activity: Not on file   Other Topics Concern    Not on file   Social History Narrative    Not on file     Social Determinants of Health     Financial Resource Strain:     Difficulty of Paying Living Expenses:    Food Insecurity:     Worried About Running Out of Food in the Last Year:     [address] in the Last Year:    Transportation Needs:     Lack of Transportation (Medical):  Lack of Transportation (Non-Medical):    Physical Activity:     Days of Exercise per Week:     Minutes of Exercise per Session:    Stress:     Feeling of Stress :    Social Connections:     Frequency of Communication with Friends and Family:     Frequency of Social Gatherings with Friends and Family:     Attends [employer] Services:     Active Member of Clubs or Organizations:     Attends Club or Organization Meetings:     Marital Status:    Intimate Partner Violence:     Fear of Current or Ex-Partner:     Emotionally Abused:     Physically Abused:     Sexually Abused: ALLERGIES: Patient has no known allergies. Review of Systems   Constitutional: Negative for fatigue and fever. HENT: Negative for congestion. Eyes: Negative for visual disturbance. Respiratory: Negative for choking and shortness of breath. Cardiovascular: Negative for chest pain and leg swelling. Gastrointestinal: Positive for nausea. Negative for abdominal pain, bowel incontinence and vomiting. Endocrine: Negative for polyuria. Genitourinary: Negative for bladder incontinence and dysuria. Skin: Negative for rash. Neurological: Positive for dizziness, speech difficulty, weakness and light-headedness. Negative for focal weakness and headaches. Psychiatric/Behavioral: Negative for behavioral problems, confusion and memory loss. Vitals:    05/19/21 1348   BP: (!) 105/53   Pulse: 79   Resp: 16   Temp: 98.1 °F (36.7 °C)   SpO2: 100%            Physical Exam  Constitutional:       Appearance: She is well-developed. She is not ill-appearing, toxic-appearing or diaphoretic. HENT:      Head: Normocephalic and atraumatic. Nose: Nose normal.      Mouth/Throat:      Mouth: Mucous membranes are moist.   Eyes:      Extraocular Movements: Extraocular movements intact. Conjunctiva/sclera: Conjunctivae normal.      Pupils: Pupils are equal, round, and reactive to light. Neck:      Thyroid: No thyromegaly. Trachea: No tracheal deviation. Cardiovascular:      Rate and Rhythm: Normal rate and regular rhythm. Heart sounds: Normal heart sounds. Pulmonary:      Effort: Pulmonary effort is normal. No respiratory distress. Breath sounds: Normal breath sounds. Abdominal:      Palpations: Abdomen is soft. Tenderness: There is no abdominal tenderness. Musculoskeletal:      Cervical back: Normal range of motion and neck supple. Lymphadenopathy:      Cervical: No cervical adenopathy. Skin:     General: Skin is warm and dry. Neurological:      Mental Status: She is alert and oriented to person, place, and time. GCS: GCS eye subscore is 4. GCS verbal subscore is 5. GCS motor subscore is 6. Cranial Nerves: No cranial nerve deficit. Motor: No weakness, tremor or abnormal muscle tone.       Coordination: Coordination normal.      Gait: Gait normal.      Comments: Grossly intact, no obvious deficit   Psychiatric:         Mood and Affect: Mood normal.          MDM  Number of Diagnoses or Management Options  Diagnosis management comments: Given transient slurred speech will CT image. Check electrolytes. Suspect there may be a strong component of anxiety as well as potential vertigo    ED Course as of May 19 1448   Wed May 19, 2021   1352 Went to evaluate patient, she is up ambulatory in the room, easily changing into a gown. States she will give a urine sample and then be ready for evaluation    [BT]   1412 Review of patient's supplement ingredients shows that there may be a stimulant effect as well.   This potentially could be adding to patient's symptoms    [BT]   1417 EKG normal sinus rhythm no obvious overt ischemia no prior for comparison at this time    [BT]   1439 Reviewed with neurology, they will be available for consultation, advise TIA/stroke work-up    [BT]   1448 Reviewed with hospitalist agreeable to admit patient for further work-up    [BT]      ED Course User Index  [BT] Jade Guan MD       Procedures

## 2021-05-19 NOTE — PROGRESS NOTES
Spoke with Dr. Jozef Quispe and was told  to call nursing supervisor that pt shoul be in Telemetry. Tori Oneill, MARLO supervisor notified    3726 TRANSFER - OUT REPORT:    Verbal report given to Ruth Go RN(name) on Katalina Hutchins  being transferred to Telemetry(unit) for routine progression of care       Report consisted of patients Situation, Background, Assessment and   Recommendations(SBAR). Information from the following report(s) SBAR, Kardex, Intake/Output, MAR, Recent Results and Cardiac Rhythm NSR was reviewed with the receiving nurse. Lines:   Peripheral IV 05/19/21 Right Antecubital (Active)   Site Assessment Clean, dry, & intact 05/19/21 1402   Hub Color/Line Status Pink 05/19/21 1402        Opportunity for questions and clarification was provided. Patient transported with:   Monitor    2040 Informed pt that she is going to be transferred to Telemetry unit so she can be closely monitored as ordered by MD. Pt refused to be moved at this time stating she has been in the room for hours and want to take her medications so she can sleep, insisting on knowing result of test - informed pt results/reading not in at this time. Nursing supervisor called to see pt    2100 MD on phone with pt at this time after being informed that pt refused to be transferred. Nursing supervisor in pt's room  completing NIH stroke scale    2125 Pt ambulating in hallway, provided with ice water as requested. Denied pain. Pt informed of order for BS checks, pt said she is not diabetic and does not want BS checked    0130 Up ad caleb to bathroom, no change at this time    0750 Bedside and Verbal shift change report given to Yony Jimenez RN (oncoming nurse) by Yelena Torres RN   (offgoing nurse). Report included the following information SBAR, Kardex, Intake/Output, MAR, Recent Results and Cardiac Rhythm NSR.

## 2021-05-20 ENCOUNTER — APPOINTMENT (OUTPATIENT)
Dept: NON INVASIVE DIAGNOSTICS | Age: 51
End: 2021-05-20
Attending: INTERNAL MEDICINE
Payer: MEDICAID

## 2021-05-20 VITALS
HEART RATE: 83 BPM | BODY MASS INDEX: 28.28 KG/M2 | DIASTOLIC BLOOD PRESSURE: 83 MMHG | RESPIRATION RATE: 16 BRPM | OXYGEN SATURATION: 100 % | TEMPERATURE: 99.1 F | HEIGHT: 66 IN | SYSTOLIC BLOOD PRESSURE: 124 MMHG | WEIGHT: 176 LBS

## 2021-05-20 LAB
CHOLEST SERPL-MCNC: 180 MG/DL
CK MB CFR SERPL CALC: 1.4 % (ref 0–4)
CK MB CFR SERPL CALC: 1.5 % (ref 0–4)
CK MB SERPL-MCNC: 1.7 NG/ML (ref 5–25)
CK MB SERPL-MCNC: 1.9 NG/ML (ref 5–25)
CK SERPL-CCNC: 121 U/L (ref 26–192)
CK SERPL-CCNC: 129 U/L (ref 26–192)
ECHO AO ROOT DIAM: 2.65 CM
ECHO AV AREA PEAK VELOCITY: 2.36 CM2
ECHO AV AREA VTI: 2.39 CM2
ECHO AV AREA/BSA PEAK VELOCITY: 1.2 CM2/M2
ECHO AV AREA/BSA VTI: 1.3 CM2/M2
ECHO AV MEAN GRADIENT: 8.03 MMHG
ECHO AV PEAK GRADIENT: 15.71 MMHG
ECHO AV PEAK VELOCITY: 198.2 CM/S
ECHO AV VTI: 39.58 CM
ECHO EST RA PRESSURE: 3 MMHG
ECHO IVC PROX: 1.58 CM
ECHO LA MAJOR AXIS: 3.6 CM
ECHO LA MINOR AXIS: 1.9 CM
ECHO LA VOL 2C: 30.03 ML (ref 22–52)
ECHO LA VOL 4C: 38.01 ML (ref 22–52)
ECHO LA VOL BP: 38.74 ML (ref 22–52)
ECHO LA VOL/BSA BIPLANE: 20.5 ML/M2 (ref 16–28)
ECHO LA VOLUME INDEX A2C: 15.89 ML/M2 (ref 16–28)
ECHO LA VOLUME INDEX A4C: 20.11 ML/M2 (ref 16–28)
ECHO LV E' LATERAL VELOCITY: 10 CM/S
ECHO LV E' SEPTAL VELOCITY: 12 CM/S
ECHO LV EDV A2C: 80.39 ML
ECHO LV EDV A4C: 79.96 ML
ECHO LV EDV BP: 81.1 ML (ref 56–104)
ECHO LV EDV INDEX A4C: 42.3 ML/M2
ECHO LV EDV INDEX BP: 42.9 ML/M2
ECHO LV EDV NDEX A2C: 42.5 ML/M2
ECHO LV EJECTION FRACTION A2C: 59 PERCENT
ECHO LV EJECTION FRACTION A4C: 76 PERCENT
ECHO LV EJECTION FRACTION BIPLANE: 66.2 PERCENT (ref 55–100)
ECHO LV ESV A2C: 33.24 ML
ECHO LV ESV A4C: 19.1 ML
ECHO LV ESV BP: 27.4 ML (ref 19–49)
ECHO LV ESV INDEX A2C: 17.6 ML/M2
ECHO LV ESV INDEX A4C: 10.1 ML/M2
ECHO LV ESV INDEX BP: 14.5 ML/M2
ECHO LV INTERNAL DIMENSION DIASTOLIC: 4.6 CM (ref 3.9–5.3)
ECHO LV INTERNAL DIMENSION SYSTOLIC: 2.81 CM
ECHO LV IVSD: 0.96 CM (ref 0.6–0.9)
ECHO LV MASS 2D: 117.9 G (ref 67–162)
ECHO LV MASS INDEX 2D: 62.4 G/M2 (ref 43–95)
ECHO LV POSTERIOR WALL DIASTOLIC: 0.64 CM (ref 0.6–0.9)
ECHO LVOT DIAM: 2.02 CM
ECHO LVOT PEAK GRADIENT: 8.53 MMHG
ECHO LVOT PEAK VELOCITY: 145.99 CM/S
ECHO LVOT SV: 94.6 ML
ECHO LVOT VTI: 29.5 CM
ECHO MV A VELOCITY: 66.94 CM/S
ECHO MV AREA PHT: 5.11 CM2
ECHO MV E DECELERATION TIME (DT): 148.46 MS
ECHO MV E VELOCITY: 80.41 CM/S
ECHO MV E/A RATIO: 1.2
ECHO MV E/E' LATERAL: 8.04
ECHO MV E/E' RATIO (AVERAGED): 7.37
ECHO MV E/E' SEPTAL: 6.7
ECHO MV PRESSURE HALF TIME (PHT): 43.05 MS
ECHO RA AREA 4C: 11.09 CM2
ECHO RV INTERNAL DIMENSION: 3.04 CM
ECHO RV TAPSE: 3.1 CM (ref 1.5–2)
ECHO TV REGURGITANT MAX VELOCITY: 185.68 CM/S
ECHO TV REGURGITANT PEAK GRADIENT: 13.79 MMHG
EST. AVERAGE GLUCOSE BLD GHB EST-MCNC: 105 MG/DL
HBA1C MFR BLD: 5.3 % (ref 4.2–5.6)
HDLC SERPL-MCNC: 63 MG/DL (ref 40–60)
HDLC SERPL: 2.9 {RATIO} (ref 0–5)
LDLC SERPL CALC-MCNC: 95.4 MG/DL (ref 0–100)
LIPID PROFILE,FLP: ABNORMAL
LVOT MG: 5.25 MMHG
TRIGL SERPL-MCNC: 108 MG/DL (ref ?–150)
TROPONIN I SERPL-MCNC: <0.02 NG/ML (ref 0–0.04)
TROPONIN I SERPL-MCNC: <0.02 NG/ML (ref 0–0.04)
VLDLC SERPL CALC-MCNC: 21.6 MG/DL

## 2021-05-20 PROCEDURE — 74011000250 HC RX REV CODE- 250: Performed by: INTERNAL MEDICINE

## 2021-05-20 PROCEDURE — 74011250636 HC RX REV CODE- 250/636: Performed by: INTERNAL MEDICINE

## 2021-05-20 PROCEDURE — 80061 LIPID PANEL: CPT

## 2021-05-20 PROCEDURE — 99218 HC RM OBSERVATION: CPT

## 2021-05-20 PROCEDURE — 82550 ASSAY OF CK (CPK): CPT

## 2021-05-20 PROCEDURE — 83036 HEMOGLOBIN GLYCOSYLATED A1C: CPT

## 2021-05-20 PROCEDURE — 36415 COLL VENOUS BLD VENIPUNCTURE: CPT

## 2021-05-20 PROCEDURE — 93306 TTE W/DOPPLER COMPLETE: CPT

## 2021-05-20 RX ORDER — SODIUM CHLORIDE 9 MG/ML
15 INJECTION INTRAMUSCULAR; INTRAVENOUS; SUBCUTANEOUS ONCE
Status: COMPLETED | OUTPATIENT
Start: 2021-05-20 | End: 2021-05-20

## 2021-05-20 RX ADMIN — SODIUM CHLORIDE 125 ML/HR: 900 INJECTION, SOLUTION INTRAVENOUS at 00:08

## 2021-05-20 RX ADMIN — SODIUM CHLORIDE 15 ML: 9 INJECTION INTRAMUSCULAR; INTRAVENOUS; SUBCUTANEOUS at 10:00

## 2021-05-20 NOTE — PROGRESS NOTES
OVERNIGHT COVERAGE NOTE:     Apparently patient was accidentally placed on MedSurg floor. She should be on a telemetry floor as she has been admitted for stroke evaluation. However I have been contacted by both the bedside nurse and the nursing supervisor as the patient is refusing to be moved to the telemetry unit. She apparently became very argumentative and frustrated with her bedside nurse and adamantly refused to cooperate with being moved to the telemetry side. I contacted the patient to discuss her overall plan of care and expressed to her how important it was that she be on the telemetry unit as a stroke evaluation patient so that she can get properly monitored. She was very adamant about not moving to the telemetry side and stated that she would not be \"shuffled all around\". I explained to the patient that there would be very little effort on her part to move her to her appropriate telemetry room, but she still adamantly refused to leave her current room and became increasingly agitated and upset while  talking to me and eventually said goodbye and hung up. Discussed with nursing supervisor and the plan is to document her refusal to move to telemetry side and basically let her be for the night.

## 2021-05-20 NOTE — PROGRESS NOTES
Problem: Falls - Risk of  Goal: *Absence of Falls  Description: Document Cindy Harman Fall Risk and appropriate interventions in the flowsheet.   Outcome: Resolved/Met     Problem: Anxiety  Goal: *Alleviation of anxiety  Outcome: Resolved/Met  Goal: *Alleviation of anxiety (Palliative Care)  Outcome: Resolved/Met

## 2021-05-20 NOTE — PROGRESS NOTES
Echo reported discussed with pt, planning ALFREDO -recommend, pt would like to have it done as out -pt. recommend to f/u with dr. Adrianna Stratton. Case discussed with Dr. Kole Tolentino f/u with cardiologist and no aspirin/liptor recommended. Pt would like a copy of echo.  Printed out and hand to [address]

## 2021-05-20 NOTE — PROGRESS NOTES
Patient: Jay Akhtar         YOB: 1970        DOA: 5/19/2021  Discharge Date: 5/20/21     Time to return work :     May 25,2021           Tim Solitario MD.

## 2021-05-20 NOTE — PROGRESS NOTES
SLP rec'd evaluation & treat orders. Chart reviewed and upon initial interview, it is deemed that a formal evaluation is not indicated. Pt A&Ox4; effective communicator. Pt's basic cognitive-linguistic function appears intact at this time. Observed pt with serial swallows of thin liquids; 0 overt distress noted. Accordingly, SLP will discontinue MD orders, as evaluation not indicated. SLP available for formal evaluation if further indicated by MD.    SLP educated pt on role of speech therapist in current setting with re: speech/swallow; verbalized comprehension. Results d/w RN, Yesica Irving. Thank you for this referral.  Valarie Cook.  [place] [address] [facility]  Office: [phone]  Pager: [phone]

## 2021-05-20 NOTE — DISCHARGE SUMMARY
Discharge Summary    Patient: Jay Akhtar MRN: [id number]  CSN: [id number]    YOB: 1970  Age: 46 y.o. Sex: female    DOA: 5/19/2021 LOS:  LOS: 0 days   Discharge Date: 5/20/2021     Primary Care Provider:  Delvis Kc NP    Admission Diagnoses: Slurred speech [R47.81]    Discharge Diagnoses:    Hospital Problems  Date Reviewed: 5/19/2021        Codes Class Noted POA    * (Principal) Slurred speech ICD-10-CM: R47.81  ICD-9-CM: 784.59  5/19/2021 Unknown        Tobacco abuse disorder ICD-10-CM: Z72.0  ICD-9-CM: 305.1  5/19/2021 Unknown              Discharge Condition: stable     Discharge Medications:     Discharge Medication List as of 5/20/2021  2:30 PM      CONTINUE these medications which have NOT CHANGED    Details   rOPINIRole (Requip XL) 6 mg Tb24 Take 1 Tablet by mouth. Indications: restless legs syndrome, an extreme discomfort in the calf muscles when sitting or lying down, Historical Med      PARoxetine (PaxiL) 20 mg tablet Take 20 mg by mouth every evening. Indications: anxiousness associated with depression, Historical Med      oxyCODONE-acetaminophen (PERCOCET) 5-325 mg per tablet Take 1 Tab by mouth every four (4) hours as needed for Pain. Max Daily Amount: 6 Tabs., Print, Disp-10 Tab, R-0      ibuprofen (MOTRIN) 600 mg tablet Take 1 Tab by mouth every six (6) hours as needed for Pain., Print, Disp-20 Tab, R-0             Procedures : none     Consults: Neurology      PHYSICAL EXAM   Visit Vitals  /83   Pulse 83   Temp 99.1 °F (37.3 °C)   Resp 16   Ht 5' 6\" (1.676 m)   Wt 79.8 kg (176 lb)   SpO2 100%   BMI 28.41 kg/m²     General: Awake, cooperative, no acute distress    HEENT: NC, Atraumatic. PERRLA, EOMI. Anicteric sclerae. Lungs:  CTA Bilaterally. No Wheezing/Rhonchi/Rales. Heart:  Regular  rhythm,  No murmur, No Rubs, No Gallops  Abdomen: Soft, Non distended, Non tender. +Bowel sounds,   Extremities: No c/c/e  Psych:   Not anxious or agitated.   Neurologic:  No acute neurological deficits. Admission HPI :   Dolores Holliday is a 46 y.o.  female who has history of tobacco disorder, depression and restless leg presents to the emergency room acute onset of slurred speech and dizziness that started after putting on her Thrive patch. She attempted to go to work but was feeling dizzy and lightheaded she was told to go to the emergency room by her boss. By the time she came to the emergency room her dizziness and slurred speech had improved a stat CT was done and was negative a code S was not called as her speech had improved it was thought that she should be admitted for further work-up  Patient denies history of Covid exposure she has not had her Covid vaccine     History reviewed. No pertinent past medical history.     Hospital Course :   Since she was admitted, she was monitored closely. Mri brain and CTA head and neck are normal. Echo was done with PFO and need ALFREDO for further evaluation. Echo reported discussed with pt, planning ALFREDO -recommend, pt would like to have it done as out -pt. recommend to f/u with dr. [place]. Case discussed with Dr. Luther John f/u with cardiologist and no aspirin/liptor recommended. Pt would like a copy of echo. Printed out and hand to [facility]. Smoking cessation strongly recommend. Discharge planning discussed with patient, pt agrees  with the plan and no questions and concerns at this point.        Activity: Activity as tolerated    Diet: Regular Diet    Follow-up: PCP and cardiologist     Disposition: home     Minutes spent on discharge: 45 min       Labs: Results:       Chemistry Recent Labs     05/19/21  1400   GLU 89      K 4.1      CO2 27   BUN 13   CREA 0.63   CA 9.6   AGAP 5   BUCR 21*   AP 99   TP 8.0   ALB 4.3   GLOB 3.7   AGRAT 1.2      CBC w/Diff Recent Labs     05/19/21  1400   WBC 8.7   RBC 4.59   HGB 14.7   HCT 43.0      GRANS 48   LYMPH 41   EOS 2      Cardiac Enzymes Recent Labs     05/20/21  0820 05/20/21  0245    121   CKND1 1.5 1.4      Coagulation No results for input(s): PTP, INR, APTT, INREXT in the last 72 hours. Lipid Panel Lab Results   Component Value Date/Time    Cholesterol, total 180 05/20/2021 02:45 AM    HDL Cholesterol 63 (H) 05/20/2021 02:45 AM    LDL, calculated 95.4 05/20/2021 02:45 AM    VLDL, calculated 21.6 05/20/2021 02:45 AM    Triglyceride 108 05/20/2021 02:45 AM    CHOL/HDL Ratio 2.9 05/20/2021 02:45 AM      BNP No results for input(s): BNPP in the last 72 hours. Liver Enzymes Recent Labs     05/19/21  1400   TP 8.0   ALB 4.3   AP 99      Thyroid Studies Lab Results   Component Value Date/Time    TSH 2.30 05/19/2021 02:00 PM          @micro    Significant Diagnostic Studies: MRI BRAIN WO CONT    Result Date: 5/20/2021  Brain MR without contrast: Indication: Recent episode as dizziness and slurred speech. Difficulty walking. Symptoms improved/resolved. Emergent imaging through the ER was performed after unremarkable head CT. Procedure: Sagittal spin echo T1, axial FSE FLAIR and T2 and axial diffusion weighted scanning was performed. An axial T2* scan optimized to detect hemosiderin and calcium was performed. Coronal spin echo T1and FSE T2 scans were also performed. No contrast was administered. Comparison exam: Head CT same day. Findings: Diffusion: There are no areas of restricted diffusion, no acute infarction. The T2* scan shows no acute or chronic hemorrhage or abnormal calcifications. Brain parenchyma: No focal brain lesion. No mass or mass effect. Ventricles and sulci: Normal, no significant brain atrophy. Extra axial: No extra axial fluid collection or mass. Brain vasculature: Normal, no arterial vascular abnormality is noted. Craniocervical Junction: Sagittal T1 sequences limited by patient motion but no definite abnormality of the craniocervical junction. Extracranial and skull base:   There is some mucosal thickening in the right maxillary sinus with a thick maxillary sinus wall, reactive to previous chronic disease. No significant intracranial abnormality is demonstrated. Initial after-hours report was provided by [facility]. CT HEAD WO CONT    Result Date: 5/19/2021  EXAM: CT head INDICATION: Dizziness COMPARISON: None. TECHNIQUE: Axial CT imaging of the head was performed without intravenous contrast. Standard multiplanar coronal and sagittal reformatted images were obtained and are included in interpretation. One or more dose reduction techniques were used on this CT: automated exposure control, adjustment of the mAs and/or kVp according to patient size, and iterative reconstruction techniques. The specific techniques used on this CT exam have been documented in the patient's electronic medical record. Digital Imaging and Communications in Medicine (DICOM) format image data are available to nonaffiliated external healthcare facilities or entities on a secure, media free, reciprocally searchable basis with patient authorization for at least a 12-month period after this study. _______________ FINDINGS: BRAIN AND POSTERIOR FOSSA: The sulci, folia, ventricles and basal cisterns are within normal limits for the patient?s age. There is no intracranial hemorrhage, mass effect, or midline shift. There are no areas of abnormal parenchymal attenuation. EXTRA-AXIAL SPACES AND MENINGES: There are no abnormal extra-axial fluid collections. CALVARIUM: Intact. SINUSES: Clear. OTHER: None. _______________     1. No acute intracranial abnormality. XR CHEST PORT    Result Date: 5/19/2021  EXAM: XR CHEST PORT CLINICAL INDICATION/HISTORY: sob -Additional: Dizziness COMPARISON: None TECHNIQUE: Frontal view of the chest _______________ FINDINGS: HEART AND MEDIASTINUM: Normal cardiac size and mediastinal contours. LUNGS AND PLEURAL SPACES: No focal pneumonic consolidation, pneumothorax, or pleural effusion.  BONY THORAX AND SOFT TISSUES: No acute osseous abnormality _______________     No acute radiographic cardiopulmonary abnormality. CTA HEAD NECK W CONT    Result Date: 5/20/2021  EXAM: CT arteriogram of the head and neck. INDICATION:  \"stroke. \" ADDITIONAL HISTORY:  Dizziness. COMPARISON:  No prior CTA or MRA available for comparison. TECHNIQUE:  Three-dimensional surface renderings, maximum intensity projection reformations, and curved planar reformations were post-processed at a dedicated outside facility [facility]). Percent stenoses are reported with reference to the downstream lumen (\"NASCET\" method). DOSE REDUCTION AND DICOM INFORMATION: One or more dose reduction techniques were used on this CT: automated exposure control, adjustment of the mAs and/or kVp according to patient size, and iterative reconstruction techniques. The specific techniques used on this CT exam have been documented in the patient's electronic medical record. Digital Imaging and Communications in Medicine (DICOM) format image data are available to nonaffiliated external healthcare facilities or entities on a secure, media free, reciprocally searchable basis with patient authorization for at least a 12-month period after this study. _______________ FINDINGS:      ARTERIOGRAPHIC BOLUS QUALITY:  Diagnostic. GENERALIZED HEAD AND NECK ATHEROSCLEROTIC BURDEN:  Negligible. VASCULAR TORTUOSITY:  None significant. AORTIC ARCH VESSELS:  Conventional 3 vessel anatomy. Brachiocephalic:  No significant stenosis. RSubClav:  No significant proximal stenosis. LSubClav:  No significant proximal stenosis. RIGHT CAROTID ARTERY:           CCA:  Widely patent. ICA (extracranial): Widely patent. 0% stenosis. Intracranial ICA:  Widely patent. LEFT CAROTID ARTERY:              CCA:  Widely patent. ICA (extracranial): Widely patent. 0% stenosis. Intracranial ICA:  Widely patent.       VERTEBRAL ARTERIES, PICAs, SCAs:           VA dominance:  Codominant. Right VA:  Widely patent. Left VA:  Widely patent. PICAs:  Opacified bilaterally. SCAs:  Opacified bilaterally. BASILAR AND CEREBRAL ARTERIES:            3D image quality:  Diagnostic. ANEURYSMS: No saccular aneurysm is detected. RMCA:  Unremarkable. RACA:   Unremarkable. LMCA:   Unremarkable. LACA:   Unremarkable. Basilar artery:   Unremarkable. RPCA:   Unremarkable. LPCA:   Unremarkable.         _______________        CTA NECK: Negative. CTA BRAIN: Negative. _______________     ECHO ADULT COMPLETE    Result Date: 5/20/2021  · LV: Estimated LVEF is 55 - 60%. Normal cavity size, wall thickness, systolic function (ejection fraction normal) and diastolic function. E'E= 7.37. · IAS: Agitated saline contrast study was performed. There was a mild right to left shunt in the baseline state suggestive of a patent foramen ovale. suggestive of a patent foramen ovale. · Tricuspid regurgitation is inadequate for estimation of right ventricular systolic pressure. · PA: Pulmonary arterial systolic pressure is 16 mmHg.               [facility]     CC: Tramaine Herrera NP

## 2021-05-20 NOTE — PROGRESS NOTES
5/20/2021 PT note: consult received and chart reviewed. Evaluation attempted. Pt currently off unit for Echo. Nurse Brody Aviles does report pt has been ambulatory independently in lou. Will f/u at later time as pt schedule. Thank you for this referral.   Farida Gloria, PT      11:48: Pt evaluation attempted. Pt back from test and found sitting with legs folded benefit her in bed. Pt reports independence in mobility including walking in lou without difficulty. Reports no further dizziness, no numbness, tingling or weakness of extremities. States she does not need physical therapy at this time. Pt screened out from services and no further intervention indicated at this time.  Thank you for this referral.   David, PT

## 2021-05-20 NOTE — PROGRESS NOTES
Received OT eval and treat orders. Screened patient to identify level of assistance needed for ADLs and functional mobility/transfers. Patient presents to be at baseline and is independent in performing ADLs and functional mobility/transfers. Pt is not appropriate for skilled OT interventions at this time. Current OT orders weill be discontinued.     Thank you for this referral.    Sergio Perkins, OTR/L

## 2021-05-20 NOTE — CONSULTS
NEUROLOGY CONSULTATION NOTE    Patient: Hemal Chacon MRN: [id number]  CSN: [id number]    YOB: 1970  Age: 46 y.o. Sex: female    DOA: 2021 LOS:  LOS: 0 days        Requesting Physician: Dr. Reji Torres  Reason for Consultation: TIA            HISTORY OF PRESENT ILLNESS:   Hemal Chacon is a 46 y.o. female with past medical history of depression and restless leg syndrome presents to emergency room for lightheadedness and stuttering. Patient related that she had 2 [employer] the night before and had nausea, abdominal pain when she was driving to work. She threw up on the way there. She continues to feel lightheaded, abdominal discomfort, blurry vision and stuttering when she arrived at work. She also uses [employer] patch and drink coffee. Symptoms lasted for 3 to 4 hours and spontaneously resolved. Brain MRI and the CTA head and neck are normal.   Patient returned to her normal self this morning. PAST MEDICAL HISTORY:  History reviewed. No pertinent past medical history. PAST SURGICAL HISTORY:  Past Surgical History:   Procedure Laterality Date    HX APPENDECTOMY      HX  SECTION      HX TUBAL LIGATION       FAMILY HISTORY:  History reviewed. No pertinent family history.   SOCIAL HISTORY:  Social History     Socioeconomic History    Marital status: SINGLE     Spouse name: Not on file    Number of children: Not on file    Years of education: Not on file    Highest education level: Not on file   Tobacco Use    Smoking status: Current Every Day Smoker     Packs/day: 0.50    Smokeless tobacco: Never Used   Substance and Sexual Activity    Alcohol use: Yes     Comment: socially    Drug use: No     Social Determinants of Health     Financial Resource Strain:     Difficulty of Paying Living Expenses:    Food Insecurity:     Worried About Running Out of Food in the Last Year:     [address] in the Last Year:    Transportation Needs:     Lack of Transportation (Medical):    [facility] Lack of Transportation (Non-Medical):    Physical Activity:     Days of Exercise per Week:     Minutes of Exercise per Session:    Stress:     Feeling of Stress :    Social Connections:     Frequency of Communication with Friends and Family:     Frequency of Social Gatherings with Friends and Family:     Attends Manda[employer] Services:     Active Member of Clubs or Organizations:     Attends Club or Organization Meetings:     Marital Status:      MEDICATIONS:  Current Facility-Administered Medications   Medication Dose Route Frequency    PARoxetine (PAXIL) tablet 20 mg  20 mg Oral QHS    rOPINIRole (REQUIP) tablet 3 mg  3 mg Oral QHS    0.9% sodium chloride infusion  125 mL/hr IntraVENous CONTINUOUS    0.9% sodium chloride infusion  125 mL/hr IntraVENous CONTINUOUS    atorvastatin (LIPITOR) tablet 80 mg  80 mg Oral QHS    aspirin chewable tablet 81 mg  81 mg Oral DAILY    labetaloL (NORMODYNE;TRANDATE) 20 mg/4 mL (5 mg/mL) injection 5 mg  5 mg IntraVENous Q10MIN PRN    polyethylene glycol (MIRALAX) packet 17 g  17 g Oral DAILY PRN    heparin (porcine) injection 5,000 Units  5,000 Units SubCUTAneous Q8H     Prior to Admission medications    Medication Sig Start Date End Date Taking? Authorizing Provider   rOPINIRole (Requip XL) 6 mg Tb24 Take 1 Tablet by mouth. Indications: restless legs syndrome, an extreme discomfort in the calf muscles when sitting or lying down   Yes Provider, Historical   PARoxetine (PaxiL) 20 mg tablet Take 20 mg by mouth every evening. Indications: anxiousness associated with depression   Yes Provider, Historical   oxyCODONE-acetaminophen (PERCOCET) 5-325 mg per tablet Take 1 Tab by mouth every four (4) hours as needed for Pain. Max Daily Amount: 6 Tabs. Patient not taking: Reported on 5/19/2021 8/25/17   Adarsh Andrews PA   ibuprofen (MOTRIN) 600 mg tablet Take 1 Tab by mouth every six (6) hours as needed for Pain.   Patient not taking: Reported on 5/19/2021 8/25/17   Darryl MARGIE Thakkar       ALLERGIES:  No Known Allergies    Review of Systems  GENERAL: No fevers or chills. HEENT: No change in vision, earache, tinnitus, sore throat or sinus congestion. NECK: No pain or stiffness. CARDIOVASCULAR: No chest pain or pressure. No palpitations. PULMONARY: No shortness of breath, cough or wheeze. GASTROINTESTINAL: No abdominal pain, nausea, vomiting or diarrhea. GENITOURINARY: No urinary frequency, urgency, hesitancy or dysuria. MUSCULOSKELETAL: No joint or muscle pain, no back pain, no recent trauma. DERMATOLOGIC: No rash, no itching, no lesions. ENDOCRINE: No polyuria, polydipsia, no heat or cold intolerance. No recent change in weight. HEMATOLOGICAL: No anemia or easy bruising or bleeding. NEUROLOGIC: No headache, seizures, numbness, tingling or weakness. PHYSICAL EXAMINATION:     Visit Vitals  BP (!) 86/47   Pulse 71   Temp 98.1 °F (36.7 °C)   Resp 16   Ht 5' 6\" (1.676 m)   Wt 79.8 kg (176 lb)   SpO2 100%   BMI 28.41 kg/m²      O2 Device: None (Room air)  GENERAL: Pleasant, in no apparent distress. HEENT: Moist mucous membranes, sclerae anicteric, scalp is atraumatic. CVS: Regular rate and rhythm, no murmurs or gallops. No carotid bruits. PULMONARY: Clear to auscultation bilaterally. No rales or rhonchi. No wheezing. EXTREMITIES: Normal range of motion at all sites. No deformities. ABDOMEN: Soft, nontender. SKIN: No rashes or ecchymoses. Warm and dry. NEUROLOGIC: Alert and oriented x3. Speech is fluent without any aphasia or dysarthria. Cranial nerves: Face is symmetric with symmetric smile. Facial sensation is intact. Extraocular movements are intact with no nystagmus. Visual fields are full to confrontation. PERRL. Tongue is midline. Palate elevates symmetrically. Hearing intact to speech. Sternocleidomastoid and trapezius strengths are full bilaterally. Motor: Normal tone and normal bulk on all four extremities.  Strength is full on all four segmentally. There is no pronator drift or orbiting. Sensory: Intact to pinprick and touch on all four. Normal vibratory sensation on toes bilaterally. Coordination: Intact coordination with finger-nose-finger bilaterally. Normal fine movements. No bradykinesia detected. Deep tendon reflexes: 2+ at biceps, brachioradialis, patella and ankles bilaterally. Toes are down-going bilaterally. Gait assessment: Able to stand and walk with no difficulty. Able to perform tandem gait with no difficulty. Labs: Results:       Chemistry Recent Labs     05/19/21  1400   GLU 89      K 4.1      CO2 27   BUN 13   CREA 0.63   CA 9.6   AGAP 5   BUCR 21*   AP 99   TP 8.0   ALB 4.3   GLOB 3.7   AGRAT 1.2      CBC w/Diff Recent Labs     05/19/21  1400   WBC 8.7   RBC 4.59   HGB 14.7   HCT 43.0      GRANS 48   LYMPH 41   EOS 2      Cardiac Enzymes Recent Labs     05/20/21  0820 05/20/21  0245    121   CKND1 1.5 1.4      Coagulation No results for input(s): PTP, INR, APTT, INREXT in the last 72 hours. Lipid Panel No results found for: CHOL, CHOLPOCT, CHOLX, CHLST, CHOLV, 466947, HDL, HDLP, LDL, LDLC, DLDLP, 708255, VLDLC, VLDL, TGLX, TRIGL, TRIGP, TGLPOCT, CHHD, CHHDX   BNP No results for input(s): BNPP in the last 72 hours. Liver Enzymes Recent Labs     05/19/21  1400   TP 8.0   ALB 4.3   AP 99      Thyroid Studies Lab Results   Component Value Date/Time    TSH 2.30 05/19/2021 02:00 PM          Radiology:  MRI BRAIN WO CONT    Result Date: 5/20/2021  Brain MR without contrast: Indication: Recent episode as dizziness and slurred speech. Difficulty walking. Symptoms improved/resolved. Emergent imaging through the ER was performed after unremarkable head CT. Procedure: Sagittal spin echo T1, axial FSE FLAIR and T2 and axial diffusion weighted scanning was performed. An axial T2* scan optimized to detect hemosiderin and calcium was performed. Coronal spin echo T1and FSE T2 scans were also performed.   No contrast was administered. Comparison exam: Head CT same day. Findings: Diffusion: There are no areas of restricted diffusion, no acute infarction. The T2* scan shows no acute or chronic hemorrhage or abnormal calcifications. Brain parenchyma: No focal brain lesion. No mass or mass effect. Ventricles and sulci: Normal, no significant brain atrophy. Extra axial: No extra axial fluid collection or mass. Brain vasculature: Normal, no arterial vascular abnormality is noted. Craniocervical Junction: Sagittal T1 sequences limited by patient motion but no definite abnormality of the craniocervical junction. Extracranial and skull base: There is some mucosal thickening in the right maxillary sinus with a thick maxillary sinus wall, reactive to previous chronic disease. No significant intracranial abnormality is demonstrated. Initial after-hours report was provided by [email or web address]. CT HEAD WO CONT    Result Date: 5/19/2021  EXAM: CT head INDICATION: Dizziness COMPARISON: None. TECHNIQUE: Axial CT imaging of the head was performed without intravenous contrast. Standard multiplanar coronal and sagittal reformatted images were obtained and are included in interpretation. One or more dose reduction techniques were used on this CT: automated exposure control, adjustment of the mAs and/or kVp according to patient size, and iterative reconstruction techniques. The specific techniques used on this CT exam have been documented in the patient's electronic medical record. Digital Imaging and Communications in Medicine (DICOM) format image data are available to nonaffiliated external healthcare facilities or entities on a secure, media free, reciprocally searchable basis with patient authorization for at least a 12-month period after this study. _______________ FINDINGS: BRAIN AND POSTERIOR FOSSA: The sulci, folia, ventricles and basal cisterns are within normal limits for the patient?s age.  There is no intracranial hemorrhage, mass effect, or midline shift. There are no areas of abnormal parenchymal attenuation. EXTRA-AXIAL SPACES AND MENINGES: There are no abnormal extra-axial fluid collections. CALVARIUM: Intact. SINUSES: Clear. OTHER: None. _______________     1. No acute intracranial abnormality. XR CHEST PORT    Result Date: 5/19/2021  EXAM: XR CHEST PORT CLINICAL INDICATION/HISTORY: sob -Additional: Dizziness COMPARISON: None TECHNIQUE: Frontal view of the chest _______________ FINDINGS: HEART AND MEDIASTINUM: Normal cardiac size and mediastinal contours. LUNGS AND PLEURAL SPACES: No focal pneumonic consolidation, pneumothorax, or pleural effusion. BONY THORAX AND SOFT TISSUES: No acute osseous abnormality _______________     No acute radiographic cardiopulmonary abnormality. CTA HEAD NECK W CONT    Result Date: 5/20/2021  EXAM: CT arteriogram of the head and neck. INDICATION:  \"stroke. \" ADDITIONAL HISTORY:  Dizziness. COMPARISON:  No prior CTA or MRA available for comparison. TECHNIQUE:  Three-dimensional surface renderings, maximum intensity projection reformations, and curved planar reformations were post-processed at a dedicated outside facility [facility]). Percent stenoses are reported with reference to the downstream lumen (\"NASCET\" method). DOSE REDUCTION AND DICOM INFORMATION: One or more dose reduction techniques were used on this CT: automated exposure control, adjustment of the mAs and/or kVp according to patient size, and iterative reconstruction techniques. The specific techniques used on this CT exam have been documented in the patient's electronic medical record. Digital Imaging and Communications in Medicine (DICOM) format image data are available to nonaffiliated external healthcare facilities or entities on a secure, media free, reciprocally searchable basis with patient authorization for at least a 12-month period after this study.  _______________ FINDINGS:      ARTERIOGRAPHIC BOLUS QUALITY: Diagnostic. GENERALIZED HEAD AND NECK ATHEROSCLEROTIC BURDEN:  Negligible. VASCULAR TORTUOSITY:  None significant. AORTIC ARCH VESSELS:  Conventional 3 vessel anatomy. Brachiocephalic:  No significant stenosis. RSubClav:  No significant proximal stenosis. LSubClav:  No significant proximal stenosis. RIGHT CAROTID ARTERY:           CCA:  Widely patent. ICA (extracranial): Widely patent. 0% stenosis. Intracranial ICA:  Widely patent. LEFT CAROTID ARTERY:              CCA:  Widely patent. ICA (extracranial): Widely patent. 0% stenosis. Intracranial ICA:  Widely patent. VERTEBRAL ARTERIES, PICAs, SCAs:           VA dominance:  Codominant. Right VA:  Widely patent. Left VA:  Widely patent. PICAs:  Opacified bilaterally. SCAs:  Opacified bilaterally. BASILAR AND CEREBRAL ARTERIES:            3D image quality:  Diagnostic. ANEURYSMS: No saccular aneurysm is detected. RMCA:  Unremarkable. RACA:   Unremarkable. LMCA:   Unremarkable. LACA:   Unremarkable. Basilar artery:   Unremarkable. RPCA:   Unremarkable. LPCA:   Unremarkable.         _______________        CTA NECK: Negative. CTA BRAIN: Negative. _______________     ASSESSMENT/IMPRESSION and RECOMMENDATIONS:  59-year-old female presents with lightheaded, G.I. discomfort and nausea as well as stuttering for 3 to 4 hours. Symptoms resolved spontaneously. Brain MRI and CTA head and neck are normal.Lightheadedness and stuttering are consistent with presyncopal episode from G.I. discomfort. Symptoms has resolved.  Patient may be discharged from neurologist standpoint.    ------------------------------------  Christiano Collins MD  5/20/2021  11:50 AM

## 2021-05-20 NOTE — PROGRESS NOTES
Reason for Admission:     Dizziness                    RUR Score:   N/A                  Plan for utilizing home health:   Unlikely        PCP: First and Last name:  Kenya Grimm NP     Name of Practice:    Are you a current patient: Yes/No:    Approximate date of last visit:    Can you participate in a virtual visit with your PCP:                     Current Advanced Directive/Advance Care Plan: Full Code      Healthcare Decision Maker:   Click here to complete [address] including selection of the Healthcare Decision Maker Relationship (ie \"Primary\")             Primary Decision Maker: LONNIE [facility] [phone]                  Transition of Care Plan:     Home with physician follow up                  Chart reviewed. Per H&P Maria Alejandra Stern is a 46 y.o.  female who has history of tobacco disorder, depression and restless leg presents to the emergency room acute onset of slurred speech and dizziness that started after putting on her [employer] patch. She attempted to go to work but was feeling dizzy and lightheaded she was told to go to the emergency room by her boss. By the time she came to the emergency room her dizziness and slurred speech had improved a stat CT was done and was negative a code S was not called as her speech had improved it was thought that she should be admitted for further work-up  Patient denies history of Covid exposure she has not had her Covid vaccine.:    CM spoke with pt to discuss transition of care. Pt is independent and her significant other will assist as needed. Pt confirmed her PCP. Please encourage ambulation as appropriate to assist with identifying potential transition of care needs. CM to continue to follow and assist as needed. Anticipate pt will transition home with physician follow up when medically stable. CM to continue to follow and assist.    Care Management Interventions  Mode of Transport at Discharge:  Other (see comment) (Family/Friend)  Transition of Care Consult (CM Consult): Discharge Planning  Health Maintenance Reviewed: Yes  Physical Therapy Consult: Yes  Occupational Therapy Consult: Yes  Current Support Network:  Other (lives with significant other)  The Plan for Transition of Care is Related to the Following Treatment Goals : Home with physician follow up   Discharge Location  Discharge Placement: Home with family assistance

## 2021-05-20 NOTE — DISCHARGE INSTRUCTIONS
Patient Education        Learning About Transient Ischemic Attack (TIA)  What is a TIA? A transient ischemic attack (TIA) means that the blood flow to a part of the brain is blocked for a short time. A TIA feels like a stroke but usually lasts only 10 to 20 minutes. Unlike a stroke, a TIA does not cause lasting brain damage. A TIA is usually caused by a blood clot. The clot can form in another part of the body and travel through the bloodstream to the brain. When blood flow to part of the brain is blocked, the brain cells in that area are affected within seconds. This causes symptoms in parts of the body controlled by those brain cells. When the blood flow returns, the symptoms go away. Sometimes a TIA is caused by a sharp drop in blood pressure that reduces blood flow to the brain. This is called a \"low-flow\" TIA. It's less common than a TIA caused by a blood clot. What happens after a TIA? TIA is a serious warning sign of a possible stroke in the future. If you have other medical conditions such as coronary artery disease or atherosclerosis, you may also have an increased risk for a heart attack. Talk to your doctor about your risk. Understanding your risk will help you and your doctor plan your treatment options. You can do a lot to lower your chance of having another TIA or a stroke. Medicines can help, and you may also need to make lifestyle changes. What are the symptoms? Symptoms of a TIA are the same as symptoms of a stroke. But symptoms of a TIA don't last very long. Most of the time, they go away in 10 to 20 minutes. They may include:  · Sudden numbness, tingling, weakness, or loss of movement in your face, arm, or leg, especially on only one side of your body. · Sudden vision changes. · Sudden trouble speaking. · Sudden confusion or trouble understanding simple statements. · Sudden problems with walking or balance.   If you have any of these symptoms, call 911 or other emergency services right away.    Ask your family, friends, and coworkers to learn the signs of a TIA. They may notice these signs before you do. Make sure they know to call 911 if these signs appear. How is a TIA treated? If you've had a TIA, you may need more testing and treatment after you get checked by your doctor. If you have a high risk of stroke, you may have to stay in the hospital for treatment. Your treatment for a TIA may include taking medicines to prevent blood clots or a stroke, or having surgery to reopen narrow arteries. How can you prevent another TIA? Here are some ways to reduce your risk of having another TIA. · Work with your doctor to treat any health problems you have. High blood pressure, high cholesterol, atrial fibrillation, and diabetes all raise your chances of having a stroke. · Take your medicine exactly as prescribed. Call your doctor if you think you are having a problem with your medicine. · Have a healthy lifestyle. ? Do not smoke or allow others to smoke around you. If you need help quitting, talk to your doctor. Smoking makes a stroke more likely. ? Limit alcohol to 2 drinks a day for men and 1 drink a day for women. ? Lose weight if you need to. A healthy weight will help you keep your heart and body healthy. ? Be active. Ask your doctor what type and level of activity are safe for you.  ? Eat heart-healthy foods, like fruits, vegetables, and high-fiber foods. Follow-up care is a key part of your treatment and safety. Be sure to make and go to all appointments, and call your doctor if you are having problems. It's also a good idea to know your test results and keep a list of the medicines you take. When should you call for help? Call 911 anytime you think you may need emergency care. For example, call if:    · You have new or worse symptoms of a stroke.  These may include:  ? Sudden numbness, tingling, weakness, or loss of movement in your face, arm, or leg, especially on only one side of your body. ? Sudden vision changes. ? Sudden trouble speaking. ? Sudden confusion or trouble understanding simple statements. ? Sudden problems with walking or balance. ? A sudden, severe headache that is different from past headaches. Call 911 even if these symptoms go away in a few minutes.     · You feel like you are having another TIA. Watch closely for changes in your health, and be sure to contact your doctor if you have any problems. Where can you learn more? Go to http://www.gray.com/  Enter Z925 in the search box to learn more about \"Learning About Transient Ischemic Attack (TIA). \"  Current as of: March 4, 2020               Content Version: 12.8  © 3982-9501 [employer], [employer]. Care instructions adapted under license by [employer] (which disclaims liability or warranty for this information). If you have questions about a medical condition or this instruction, always ask your healthcare professional. [place] [address] any warranty or liability for your use of this information.